# Patient Record
Sex: FEMALE | Race: WHITE | NOT HISPANIC OR LATINO | Employment: UNEMPLOYED | URBAN - METROPOLITAN AREA
[De-identification: names, ages, dates, MRNs, and addresses within clinical notes are randomized per-mention and may not be internally consistent; named-entity substitution may affect disease eponyms.]

---

## 2020-08-05 ENCOUNTER — APPOINTMENT (OUTPATIENT)
Dept: ULTRASOUND IMAGING | Facility: HOSPITAL | Age: 25
End: 2020-08-05

## 2020-08-05 ENCOUNTER — HOSPITAL ENCOUNTER (EMERGENCY)
Facility: HOSPITAL | Age: 25
Discharge: HOME | End: 2020-08-05
Attending: EMERGENCY MEDICINE | Admitting: EMERGENCY MEDICINE

## 2020-08-05 VITALS
SYSTOLIC BLOOD PRESSURE: 146 MMHG | HEIGHT: 65 IN | WEIGHT: 210 LBS | RESPIRATION RATE: 18 BRPM | BODY MASS INDEX: 34.99 KG/M2 | DIASTOLIC BLOOD PRESSURE: 96 MMHG | TEMPERATURE: 98.2 F | OXYGEN SATURATION: 100 % | HEART RATE: 97 BPM

## 2020-08-05 DIAGNOSIS — R10.9 ABDOMINAL PAIN AFFECTING PREGNANCY: ICD-10-CM

## 2020-08-05 DIAGNOSIS — O26.899 ABDOMINAL PAIN AFFECTING PREGNANCY: ICD-10-CM

## 2020-08-05 DIAGNOSIS — Z34.91 FIRST TRIMESTER PREGNANCY: Primary | ICD-10-CM

## 2020-08-05 DIAGNOSIS — O23.41 URINARY TRACT INFECTION IN MOTHER DURING FIRST TRIMESTER OF PREGNANCY: ICD-10-CM

## 2020-08-05 LAB
BACTERIA UR QL AUTO: ABNORMAL /HPF
BILIRUB UR QL STRIP: NEGATIVE
CLARITY UR: ABNORMAL
COLOR UR: YELLOW
GLUCOSE UR STRIP-MCNC: NEGATIVE MG/DL
HGB UR QL STRIP.AUTO: NEGATIVE
HYALINE CASTS UR QL AUTO: ABNORMAL /LPF
KETONES UR QL STRIP: NEGATIVE
LEUKOCYTE ESTERASE UR QL STRIP.AUTO: ABNORMAL
MUCOUS THREADS URNS QL MICRO: ABNORMAL /HPF
NITRITE UR QL STRIP: NEGATIVE
PH UR STRIP.AUTO: 6 [PH] (ref 5–8)
PROT UR QL STRIP: NEGATIVE
RBC # UR: ABNORMAL /HPF
REF LAB TEST METHOD: ABNORMAL
SP GR UR STRIP: 1.02 (ref 1–1.03)
SQUAMOUS #/AREA URNS HPF: ABNORMAL /HPF
UROBILINOGEN UR QL STRIP: ABNORMAL
WBC UR QL AUTO: ABNORMAL /HPF

## 2020-08-05 PROCEDURE — 99283 EMERGENCY DEPT VISIT LOW MDM: CPT

## 2020-08-05 PROCEDURE — 76817 TRANSVAGINAL US OBSTETRIC: CPT

## 2020-08-05 PROCEDURE — 81001 URINALYSIS AUTO W/SCOPE: CPT | Performed by: EMERGENCY MEDICINE

## 2020-08-05 RX ORDER — MELOXICAM 15 MG/1
15 TABLET ORAL DAILY
COMMUNITY
End: 2020-08-05 | Stop reason: HOSPADM

## 2020-08-05 RX ORDER — CEPHALEXIN 500 MG/1
500 CAPSULE ORAL 4 TIMES DAILY
Qty: 40 CAPSULE | Refills: 0 | Status: SHIPPED | OUTPATIENT
Start: 2020-08-05 | End: 2020-08-15

## 2020-08-05 RX ORDER — ACETAMINOPHEN 500 MG
500 TABLET ORAL EVERY 4 HOURS PRN
Qty: 20 TABLET | Refills: 0 | Status: SHIPPED | OUTPATIENT
Start: 2020-08-05 | End: 2021-01-14

## 2020-08-06 NOTE — ED PROVIDER NOTES
EMERGENCY DEPARTMENT ENCOUNTER      Pt Name: Selin De Leon  MRN: [id number]  YOB: 1995  Date of evaluation: 8/5/2020  Provider: Ghulam Slaughter DO    CHIEF COMPLAINT       Chief Complaint   Patient presents with   • Abdominal Pain   • Pregnancy Problem         HISTORY OF PRESENT ILLNESS  (Location/Symptom, Timing/Onset, Context/Setting, Quality, Duration, Modifying Factors, Severity.)   Selin De Leon is a 25 y.o. female who presents to the emergency department for evaluation of fever complaints.  Mainly some abdominal fullness, cramping, concern for an early pregnancy.  She denies any vaginal bleeding or discharge, denies any dysuria but has had some polyuria.  Does have a history of recurrent urinary tract infections.  She states she is now G5, P4.  Does not have an OB/GYN in the area to follow with and is requesting one.  She states she is on prenatal vitamins, has stopped drinking but is still smoking.  She denies any bowel complaints, chest pain or difficulty breathing, no fevers or chills.  Patient states secondary to the abdominal pain, no confirmed IUP she wanted be checked out for reassurance.  Patient has no other acute systemic complaints at this time.      Nursing notes were reviewed.    REVIEW OF SYSTEMS    (2-9 systems for level 4, 10 or more for level 5)   ROS:  General:  No fevers, no chills, no weakness  Cardiovascular:  No chest pain, no palpitations  Respiratory:  No shortness of breath, no cough, no wheezing  Gastrointestinal: Positive mild lower abdominal pain, no nausea, no vomiting, no diarrhea  Musculoskeletal:  No muscle pain, no joint pain  Skin:  No rash, no easy bruising  Neurologic:  No speech problems, no headache, no extremity numbness, no extremity tingling, no extremity weakness  Psychiatric:  No anxiety  Genitourinary:  No dysuria, no hematuria    Except as noted above the remainder of the review of systems was reviewed and negative.       PAST MEDICAL HISTORY   No  "past medical history on file.      SURGICAL HISTORY     No past surgical history on file.      CURRENT MEDICATIONS     No current facility-administered medications for this encounter.     Current Outpatient Medications:   •  NON FORMULARY, TAKES MEDS FOR ANXIETY/DEPRESSION BUT IS UNSURE OF NAME AND DOSE, Disp: , Rfl:   •  acetaminophen (TYLENOL) 500 MG tablet, Take 1 tablet by mouth Every 4 (Four) Hours As Needed for Mild Pain  or Fever., Disp: 20 tablet, Rfl: 0  •  cephalexin (KEFLEX) 500 MG capsule, Take 1 capsule by mouth 4 (Four) Times a Day for 10 days., Disp: 40 capsule, Rfl: 0    ALLERGIES     Amoxicillin    FAMILY HISTORY     No family history on file.       SOCIAL HISTORY       Social History     Socioeconomic History   • Marital status: Legally      Spouse name: Not on file   • Number of children: Not on file   • Years of education: Not on file   • Highest education level: Not on file         PHYSICAL EXAM    (up to 7 for level 4, 8 or more for level 5)     Vitals:    08/05/20 2105   BP: 146/96   BP Location: Right arm   Patient Position: Sitting   Pulse: 97   Resp: 18   Temp: 98.2 °F (36.8 °C)   TempSrc: Oral   SpO2: 100%   Weight: 95.3 kg (210 lb)   Height: 165.1 cm (65\")       Physical Exam  General :Patient is awake, alert, oriented, in no acute distress, nontoxic appearing  HEENT: Pupils are equally round and reactive to light, EOMI, conjunctivae clear, sclerae white, there is no injection no icterus.  Oral mucosa is moist, no exudate. Uvula is midline  Neck: Neck is supple, full range of motion, trachea midline  Cardiac: Heart regular rate, rhythm, no murmurs, rubs, or gallops  Lungs: Lungs are clear to auscultation, there is no wheezing, rhonchi, or rales. There is no use of accessory muscles.  Chest wall: There is no tenderness to palpation over the chest wall or over ribs  Abdomen: Abdomen is soft, nondistended.  Mild discomfort in the suprapubic region without peritoneal signs.. There is " no firm or pulsatile masses, no rebound rigidity or guarding.   Musculoskeletal: 5 out of 5 strength in all 4 extremities.  No focal muscle deficits are appreciated  Neuro: Motor intact, sensory intact, level of consciousness is normal, GCS 15  Dermatology: Skin is warm and dry  Psych: Mentation is grossly normal, cognition is grossly normal. Affect is appropriate.      DIAGNOSTIC RESULTS     EKG: All EKG's are interpreted by the Emergency Department Physician who either signs or Co-signs this chart in the absence of a cardiologist.    No orders to display       RADIOLOGY:   Non-plain film images such as CT, Ultrasound and MRI are read by the radiologist. Plain radiographic images are visualized and preliminarily interpreted by the emergency physician with the below findings:      [] Radiologist's Report Reviewed:  US Ob Transvaginal   Final Result      1. Single living intrauterine pregnancy with an estimated gestational age of about 8 weeks and 2 days. Suggest OB/GYN referral, correlation with beta hCG levels, and short-term follow-up ultrasound to document adequate progression of this pregnancy and   adequacy of fetal growth.       Signer Name: Noah Bagley MD    Signed: 8/5/2020 10:38 PM    Workstation Name: [email or web address]     [facility]            ED BEDSIDE ULTRASOUND:   Performed by ED Physician - none    LABS:    I have reviewed and interpreted all of the currently available lab results from this visit (if applicable):  Results for orders placed or performed during the hospital encounter of 08/05/20   Urinalysis With Microscopic If Indicated (No Culture) - Urine, Clean Catch   Result Value Ref Range    Color, UA Yellow Yellow, Straw    Appearance, UA Cloudy (A) Clear    pH, UA 6.0 5.0 - 8.0    Specific Gravity, UA 1.018 1.001 - 1.030    Glucose, UA Negative Negative    Ketones, UA Negative Negative    Bilirubin, UA Negative Negative    Blood, UA Negative Negative    Protein, UA  "Negative Negative    Leuk Esterase, UA Trace (A) Negative    Nitrite, UA Negative Negative    Urobilinogen, UA 0.2 E.U./dL 0.2 - 1.0 E.U./dL   Urinalysis, Microscopic Only - Urine, Clean Catch   Result Value Ref Range    RBC, UA 0-2 None Seen, 0-2 /HPF    WBC, UA 6-12 (A) None Seen, 0-2 /HPF    Bacteria, UA Trace None Seen, Trace /HPF    Squamous Epithelial Cells, UA 3-6 (A) None Seen, 0-2 /HPF    Hyaline Casts, UA 7-12 0 - 6 /LPF    Mucus, UA Trace None Seen, Trace /HPF    Methodology Manual Light Microscopy         All other labs were within normal range or not returned as of this dictation.      EMERGENCY DEPARTMENT COURSE and DIFFERENTIAL DIAGNOSIS/MDM:   Vitals:    Vitals:    08/05/20 2105   BP: 146/96   BP Location: Right arm   Patient Position: Sitting   Pulse: 97   Resp: 18   Temp: 98.2 °F (36.8 °C)   TempSrc: Oral   SpO2: 100%   Weight: 95.3 kg (210 lb)   Height: 165.1 cm (65\")            Patient with generalized lower abdominal discomfort fullness, polyuria, early pregnancy.  We did obtain a transvaginal ultrasound which reveals single intrauterine pregnancy 8 weeks 2 days, heart rate 165, no other acute findings.  Urinalysis with trace leukocytes, 6-12 WBCs, given her pregnancy we will treat with Keflex antibiotics, she will continue with prenatal vitamins, we discussed Tylenol for discomfort in addition to smoking cessation, will give her OB/GYN for follow-up.  Return precautions discussed.  I discussed that we found nothing during the visit today indicating the need for further workup, admission, or the presence of an unstable medical condition.  I encouraged the patient to return to the emergency department immediately for ANY concerns, worsening, new complaints, or if symptoms persist and unable to seek follow-up in a timely fashion.  The patient/family expressed understanding and agreement with this plan.  The patient will follow-up with their PCP in 1-2 days for reevaluation.       MEDICATIONS " ADMINISTERED IN ED:  Medications - No data to display    PROCEDURES:  Procedures    CRITICAL CARE TIME    Total Critical Care time was 0 minutes, excluding separately reportable procedures.   There was a high probability of clinically significant/life threatening deterioration in the patient's condition which required my urgent intervention.      FINAL IMPRESSION      1. First trimester pregnancy    2. Abdominal pain affecting pregnancy    3. Urinary tract infection in mother during first trimester of pregnancy          DISPOSITION/PLAN     ED Disposition     ED Disposition Condition Comment    Discharge Stable           PATIENT REFERRED TO:  Cristin Thompson MD  [address] [facility] [address]  [place] [address]  [phone]    Schedule an appointment as soon as possible for a visit   [facility] Emergency Department  [address] [facility] [address] [phone]    If symptoms worsen    Your OBGYN            DISCHARGE MEDICATIONS:     Medication List      START taking these medications    acetaminophen 500 MG tablet  Commonly known as:  TYLENOL  Take 1 tablet by mouth Every 4 (Four) Hours As Needed for Mild Pain  or   Fever.     cephalexin 500 MG capsule  Commonly known as:  KEFLEX  Take 1 capsule by mouth 4 (Four) Times a Day for 10 days.        CONTINUE taking these medications    NON FORMULARY        STOP taking these medications    meloxicam 15 MG tablet  Commonly known as:  MOBIC              Comment: Please note this report has been produced using speech recognition software.      Ghulam Slaughter DO  Attending Emergency Physician               Ghulam Slaughter DO  08/05/20 2523

## 2020-08-19 LAB
2ND TRIMESTER 4 SCREEN SERPL-IMP: NEGATIVE
BACTERIA SPEC AEROBE CULT: NO GROWTH
C TRACH RRNA SPEC DONR QL NAA+PROBE: NEGATIVE
EXTERNAL ABO GROUPING: NORMAL
EXTERNAL ANTIBODY SCREEN: NORMAL
EXTERNAL HEMATOCRIT: 38 %
EXTERNAL HEMOGLOBIN: 12.9 G/DL
EXTERNAL HEPATITIS B SURFACE ANTIGEN: NEGATIVE
EXTERNAL METHADONE SCREEN URINE: POSITIVE
EXTERNAL PLATELET COUNT: 305 K/ΜL
EXTERNAL RH FACTOR: POSITIVE
EXTERNAL THYROID STIMULATING HORMONE: 0.63 M[IU]/ML
HCV AB S/CO SERPL IA: NEGATIVE
HIV 1+2 AB+HIV1 P24 AG SERPL QL IA: NEGATIVE
Lab: 0.1
N GONORRHOEA DNA SPEC QL NAA+PROBE: NEGATIVE
RUBV IGG SERPL IA-ACNC: 110
VZV IGG SER QL: 281.6

## 2020-09-11 ENCOUNTER — HOSPITAL ENCOUNTER (EMERGENCY)
Facility: HOSPITAL | Age: 25
Discharge: LEFT BEFORE DISCHARGE | End: 2020-09-11

## 2020-09-11 VITALS
WEIGHT: 210 LBS | TEMPERATURE: 98 F | HEART RATE: 97 BPM | OXYGEN SATURATION: 99 % | HEIGHT: 65 IN | RESPIRATION RATE: 16 BRPM | DIASTOLIC BLOOD PRESSURE: 75 MMHG | BODY MASS INDEX: 34.99 KG/M2 | SYSTOLIC BLOOD PRESSURE: 123 MMHG

## 2020-09-11 LAB
ALBUMIN SERPL-MCNC: 3.6 G/DL (ref 3.5–5.2)
ALBUMIN/GLOB SERPL: 1.4 G/DL
ALP SERPL-CCNC: 50 U/L (ref 39–117)
ALT SERPL W P-5'-P-CCNC: 9 U/L (ref 1–33)
ANION GAP SERPL CALCULATED.3IONS-SCNC: 8 MMOL/L (ref 5–15)
AST SERPL-CCNC: 16 U/L (ref 1–32)
BASOPHILS # BLD AUTO: 0.02 10*3/MM3 (ref 0–0.2)
BASOPHILS NFR BLD AUTO: 0.2 % (ref 0–1.5)
BILIRUB SERPL-MCNC: 0.2 MG/DL (ref 0–1.2)
BUN SERPL-MCNC: 5 MG/DL (ref 6–20)
BUN/CREAT SERPL: 9.8 (ref 7–25)
CALCIUM SPEC-SCNC: 8.6 MG/DL (ref 8.6–10.5)
CHLORIDE SERPL-SCNC: 103 MMOL/L (ref 98–107)
CO2 SERPL-SCNC: 23 MMOL/L (ref 22–29)
CREAT SERPL-MCNC: 0.51 MG/DL (ref 0.57–1)
DEPRECATED RDW RBC AUTO: 41.1 FL (ref 37–54)
EOSINOPHIL # BLD AUTO: 0.13 10*3/MM3 (ref 0–0.4)
EOSINOPHIL NFR BLD AUTO: 1.2 % (ref 0.3–6.2)
ERYTHROCYTE [DISTWIDTH] IN BLOOD BY AUTOMATED COUNT: 11.7 % (ref 12.3–15.4)
GFR SERPL CREATININE-BSD FRML MDRD: 146 ML/MIN/1.73
GLOBULIN UR ELPH-MCNC: 2.6 GM/DL
GLUCOSE SERPL-MCNC: 103 MG/DL (ref 65–99)
HCT VFR BLD AUTO: 34.6 % (ref 34–46.6)
HGB BLD-MCNC: 11.8 G/DL (ref 12–15.9)
HOLD SPECIMEN: NORMAL
HOLD SPECIMEN: NORMAL
IMM GRANULOCYTES # BLD AUTO: 0.04 10*3/MM3 (ref 0–0.05)
IMM GRANULOCYTES NFR BLD AUTO: 0.4 % (ref 0–0.5)
LIPASE SERPL-CCNC: 24 U/L (ref 13–60)
LYMPHOCYTES # BLD AUTO: 2.64 10*3/MM3 (ref 0.7–3.1)
LYMPHOCYTES NFR BLD AUTO: 24.8 % (ref 19.6–45.3)
MCH RBC QN AUTO: 32.7 PG (ref 26.6–33)
MCHC RBC AUTO-ENTMCNC: 34.1 G/DL (ref 31.5–35.7)
MCV RBC AUTO: 95.8 FL (ref 79–97)
MONOCYTES # BLD AUTO: 0.73 10*3/MM3 (ref 0.1–0.9)
MONOCYTES NFR BLD AUTO: 6.8 % (ref 5–12)
NEUTROPHILS NFR BLD AUTO: 66.6 % (ref 42.7–76)
NEUTROPHILS NFR BLD AUTO: 7.1 10*3/MM3 (ref 1.7–7)
NRBC BLD AUTO-RTO: 0 /100 WBC (ref 0–0.2)
PLATELET # BLD AUTO: 258 10*3/MM3 (ref 140–450)
PMV BLD AUTO: 9.7 FL (ref 6–12)
POTASSIUM SERPL-SCNC: 4 MMOL/L (ref 3.5–5.2)
PROT SERPL-MCNC: 6.2 G/DL (ref 6–8.5)
RBC # BLD AUTO: 3.61 10*6/MM3 (ref 3.77–5.28)
SODIUM SERPL-SCNC: 134 MMOL/L (ref 136–145)
WBC # BLD AUTO: 10.66 10*3/MM3 (ref 3.4–10.8)
WHOLE BLOOD HOLD SPECIMEN: NORMAL
WHOLE BLOOD HOLD SPECIMEN: NORMAL

## 2020-09-11 PROCEDURE — 99211 OFF/OP EST MAY X REQ PHY/QHP: CPT

## 2020-09-11 PROCEDURE — 83690 ASSAY OF LIPASE: CPT

## 2020-09-11 PROCEDURE — 85025 COMPLETE CBC W/AUTO DIFF WBC: CPT

## 2020-09-11 PROCEDURE — 80053 COMPREHEN METABOLIC PANEL: CPT

## 2020-09-11 RX ORDER — SODIUM CHLORIDE 0.9 % (FLUSH) 0.9 %
10 SYRINGE (ML) INJECTION AS NEEDED
Status: DISCONTINUED | OUTPATIENT
Start: 2020-09-11 | End: 2020-09-12 | Stop reason: HOSPADM

## 2021-01-14 ENCOUNTER — INITIAL PRENATAL (OUTPATIENT)
Dept: OBSTETRICS AND GYNECOLOGY | Facility: CLINIC | Age: 26
End: 2021-01-14

## 2021-01-14 VITALS — SYSTOLIC BLOOD PRESSURE: 124 MMHG | BODY MASS INDEX: 29.62 KG/M2 | WEIGHT: 178 LBS | DIASTOLIC BLOOD PRESSURE: 70 MMHG

## 2021-01-14 DIAGNOSIS — Z36.89 SCREENING, ANTENATAL, FOR FETAL ANATOMIC SURVEY: Primary | ICD-10-CM

## 2021-01-14 DIAGNOSIS — F17.200 SMOKER: ICD-10-CM

## 2021-01-14 DIAGNOSIS — Z34.83 ENCOUNTER FOR SUPERVISION OF OTHER NORMAL PREGNANCY IN THIRD TRIMESTER: ICD-10-CM

## 2021-01-14 PROCEDURE — 99203 OFFICE O/P NEW LOW 30 MIN: CPT | Performed by: MIDWIFE

## 2021-01-15 PROBLEM — F17.200 SMOKER: Status: ACTIVE | Noted: 2021-01-15

## 2021-01-15 NOTE — PROGRESS NOTES
Subjective     Chief Complaint   Patient presents with   • Initial Prenatal Visit     [facility] transfer [facility], Approx 30 weeks, pt has not had recent ultrasound, last one was beginnig of pregnancy. pt did not due  Glucose test.        Selin De Leon is a 26 y.o. .  Patient's last menstrual period was 2020..  She presents to be seen to initiate prenatal care with our practice. She has received care with [facility] in [place]. Her prenatal course has been benign.    Prenatal records reviewed: First prenatal visit 2020 at 10 weeks.  First ultrasound was 2020 at 13 weeks 5 days with EDC 3/15/2021.  Labs reviewed: she had positive UDS for methadone but denies usage.  She has had no visits since September and no explanation other than she was moving.      The following portions of the patient's history were reviewed and updated as appropriate:vital signs, allergies, current medications, past medical history, past social history, past surgical history and problem list.    Review of Systems -   /70   Wt 80.7 kg (178 lb)   LMP 2020   BMI 29.62 kg/m²   Gastrointestinal: denies nausea, vomiting, diarrhea or constipation   Genitourinary: denies dysuria  All other systems were reviewed and are negative    Objective     Physical Exam  Constitutional   The patient is awake, alert, well developed, well nourished and well groomed.   Cardiovascular  Heart regular rate and rhythm  No lower extremity edema  Respiratory  The patient is relaxed and breathes without effort. Lungs CTAB  Gastrointestinal   The abdomen is soft and non tender. Gravid. Size approximate to dates  +FHTs  Psychiatric :  Oriented to person, place, and time. Speech is fluent and words are clear. Thought processes are coherent, insight is good.     Imaging   US for anatomy and growth: 3 pound 14 ounce, 38th percentile, MICK 18, normal anatomy, anterior placenta        ASSESSMENT  1. IUP @ 31w4d  2. Low risk  pregnancy  3. Transfer of care    PLAN  1. Tests ordered today:  Orders Placed This Encounter   Procedures   • Urine Culture - Urine,     This is an external result entered through the Results Console.   • Chlamydia trachomatis, Neisseria gonorrhoeae, PCR - ,     This is an external result entered through the Results Console.   • US Ob 14 + Weeks Single or First Gestation     Order Specific Question:   Reason for Exam:     Answer:   anatomy   • HIV-1 / O / 2 Ag / Antibody 4th Generation     This is an external result entered through the Results Console.   • Parvovirus B19 Antibody, IgG     This is an external result entered through the Results Console.   • Obstetric Panel     This is an external result entered through the Results Console.   • Maternal Screen 4     This is an external result entered through the Results Console.     Order Specific Question:   [facility] Date of last menstrual period or estimated date of delivery (corresponding to calculation method):     Answer:   3/15/2021     Order Specific Question:   [facility] Gestational age calculation method:     Answer:   MARTINE,EDC   • Urine Drug Screen - Urine, Clean Catch     This is an external result entered through the Results Console.   • Hepatitis C Antibody     This is an external result entered through the Results Console.   • TSH     This is an external result entered through the Results Console.   • Varicella Zoster Antibody, IgG     This is an external result entered through the Results Console.   • CBC Auto Differential     Standing Status:   Future     Standing Expiration Date:   1/15/2022   • Gestational Screen 1 Hr ([facility])     Standing Status:   Future     Standing Expiration Date:   1/15/2022     2. Medications prescribed today:  No orders of the defined types were placed in this encounter.    3. Information reviewed:smoking in pregnancy, exercise in pregnancy, nutrition in pregnancy, weight gain in pregnancy, work and travel restrictions during  pregnancy, list of OTC medications acceptable in pregnancy and call coverage groups Encouraged Questions & answered   4. Reviewed records from previous OB-GYN practice         Follow up: 1 week(s)         This note was electronically signed.    Lisa Martin CNM  January 15, 2021

## 2021-01-18 ENCOUNTER — HOSPITAL ENCOUNTER (OUTPATIENT)
Facility: HOSPITAL | Age: 26
Discharge: HOME | End: 2021-01-18
Attending: MIDWIFE | Admitting: MIDWIFE

## 2021-01-18 ENCOUNTER — HOSPITAL ENCOUNTER (OUTPATIENT)
Facility: HOSPITAL | Age: 26
End: 2021-01-18
Attending: MIDWIFE | Admitting: MIDWIFE

## 2021-01-18 VITALS
SYSTOLIC BLOOD PRESSURE: 101 MMHG | HEIGHT: 65 IN | TEMPERATURE: 99.4 F | HEART RATE: 98 BPM | DIASTOLIC BLOOD PRESSURE: 55 MMHG | WEIGHT: 175 LBS | RESPIRATION RATE: 20 BRPM | OXYGEN SATURATION: 100 % | BODY MASS INDEX: 29.16 KG/M2

## 2021-01-18 LAB
A1 MICROGLOB PLACENTAL VAG QL: NEGATIVE
AMPHET+METHAMPHET UR QL: NEGATIVE
AMPHETAMINES UR QL: NEGATIVE
BARBITURATES UR QL SCN: NEGATIVE
BENZODIAZ UR QL SCN: NEGATIVE
BILIRUB UR QL STRIP: NEGATIVE
BUPRENORPHINE SERPL-MCNC: NEGATIVE NG/ML
CANNABINOIDS SERPL QL: NEGATIVE
CLARITY UR: CLEAR
COCAINE UR QL: NEGATIVE
COLOR UR: YELLOW
GLUCOSE UR STRIP-MCNC: NEGATIVE MG/DL
HGB UR QL STRIP.AUTO: NEGATIVE
KETONES UR QL STRIP: NEGATIVE
LEUKOCYTE ESTERASE UR QL STRIP.AUTO: NEGATIVE
METHADONE UR QL SCN: NEGATIVE
NITRITE UR QL STRIP: NEGATIVE
OPIATES UR QL: NEGATIVE
OXYCODONE UR QL SCN: NEGATIVE
PCP UR QL SCN: NEGATIVE
PH UR STRIP.AUTO: 6.5 [PH] (ref 5–8)
PROPOXYPH UR QL: NEGATIVE
PROT UR QL STRIP: NEGATIVE
SP GR UR STRIP: <=1.005 (ref 1–1.03)
TRICYCLICS UR QL SCN: NEGATIVE
UROBILINOGEN UR QL STRIP: NORMAL

## 2021-01-18 PROCEDURE — 84112 EVAL AMNIOTIC FLUID PROTEIN: CPT | Performed by: MIDWIFE

## 2021-01-18 PROCEDURE — 80306 DRUG TEST PRSMV INSTRMNT: CPT | Performed by: MIDWIFE

## 2021-01-18 PROCEDURE — 81003 URINALYSIS AUTO W/O SCOPE: CPT | Performed by: MIDWIFE

## 2021-01-18 PROCEDURE — G0463 HOSPITAL OUTPT CLINIC VISIT: HCPCS

## 2021-01-18 PROCEDURE — 87086 URINE CULTURE/COLONY COUNT: CPT | Performed by: MIDWIFE

## 2021-01-18 PROCEDURE — 59025 FETAL NON-STRESS TEST: CPT | Performed by: MIDWIFE

## 2021-01-18 RX ORDER — PRENATAL VIT NO.126/IRON/FOLIC 28MG-0.8MG
1 TABLET ORAL DAILY
Qty: 30 TABLET | Refills: 12 | Status: SHIPPED | OUTPATIENT
Start: 2021-01-18

## 2021-01-18 RX ORDER — PRENATAL VIT NO.126/IRON/FOLIC 28MG-0.8MG
1 TABLET ORAL DAILY
Status: ON HOLD | COMMUNITY
End: 2021-01-18 | Stop reason: SDUPTHER

## 2021-01-18 NOTE — NON STRESS TEST
Triage Note - Nursing Documentation  Labor and Delivery Admission Log    Selin De Leon  : 1995  MRN: [id number]  CSN: [id number]    Date in / Time in:  2021  Time in: 1118    Date out / Time out:    Time out: 1239    Nurse: Emily Denny RN    Patient Info: She is a 26 y.o. year old  at 32w0d with an MARTINE of 3/15/2021, by Ultrasound who was seen on the [facility].    Chief Complaint:   Chief Complaint   Patient presents with   • Abdominal Cramping     started yesterday,called office, sent here   • Leaking Fluid       Provider Instructions / Disposition: Instructed to come to l/d per office per patient. C/O ctx, fluid leakage and rash at groin area. Irregular ctx, abdomen soft, ve 0-1/thick per D Foster APRN, neg AMNISURE, urine to lab with UDS, negative. FHR at 140-145 accels noted . Tube of vaseline for raw area at [place], DC home with regular follow up    Patient Active Problem List   Diagnosis   • Smoker       NST Documentation (Only applicable > 32 weeks):

## 2021-01-19 LAB — BACTERIA SPEC AEROBE CULT: ABNORMAL

## 2021-03-15 ENCOUNTER — TELEPHONE (OUTPATIENT)
Dept: OBSTETRICS AND GYNECOLOGY | Facility: CLINIC | Age: 26
End: 2021-03-15

## 2021-03-15 NOTE — TELEPHONE ENCOUNTER
Patient has been a constant no show to appointments. Attempted to call patient and emergency contact to reach patient, both #'s call could not be completed.